# Patient Record
(demographics unavailable — no encounter records)

---

## 2025-03-06 NOTE — DISCUSSION/SUMMARY
[FreeTextEntry1] : - Pap/HPV obtained today - Bedside sono showed SLIUP, ALIS c/w LMP - Oriented to practice/goals of care - return for NIPS/prenatals - Referral given for Nuchal translucency - subchorionic hematoma and cervical polyp precautions reviewed   PHQ9 Screenin min

## 2025-03-06 NOTE — PHYSICAL EXAM
[Appropriately responsive] : appropriately responsive [Alert] : alert [No Acute Distress] : no acute distress [Soft] : soft [Non-tender] : non-tender [Non-distended] : non-distended [No HSM] : No HSM [No Lesions] : no lesions [No Mass] : no mass [Oriented x3] : oriented x3 [Examination Of The Breasts] : a normal appearance [No Masses] : no breast masses were palpable [Labia Majora] : normal [Labia Minora] : normal [Normal] : normal [Uterine Adnexae] : normal [Chaperone Present] : A chaperone was present in the examining room during all aspects of the physical examination [Polyp ___ cm] : [unfilled] ~[facility] polyp [FreeTextEntry2] : JOSE Nielson [FreeTextEntry5] : cervical polyp protruding from external os

## 2025-03-06 NOTE — HISTORY OF PRESENT ILLNESS
[No] : Patient does not have concerns regarding sex [Currently Active] : currently active [FreeTextEntry1] : 32yo   LMP 25 here for annual exam.  Also found to have missed menses and positive urine pregnancy test.

## 2025-03-06 NOTE — PHYSICAL EXAM
[Appropriately responsive] : appropriately responsive [Alert] : alert [No Acute Distress] : no acute distress [Soft] : soft [Non-tender] : non-tender [Non-distended] : non-distended [No HSM] : No HSM [No Lesions] : no lesions [No Mass] : no mass [Oriented x3] : oriented x3 [Examination Of The Breasts] : a normal appearance [No Masses] : no breast masses were palpable [Labia Majora] : normal [Labia Minora] : normal [Normal] : normal [Uterine Adnexae] : normal [Chaperone Present] : A chaperone was present in the examining room during all aspects of the physical examination [Polyp ___ cm] : [unfilled] ~[facility] polyp [FreeTextEntry2] : JOSE Nielson [FreeTextEntry5] : cervical polyp protruding from external os

## 2025-03-06 NOTE — HISTORY OF PRESENT ILLNESS
[No] : Patient does not have concerns regarding sex [Currently Active] : currently active [FreeTextEntry1] : 34yo   LMP 25 here for annual exam.  Also found to have missed menses and positive urine pregnancy test.

## 2025-05-15 NOTE — ASSESSMENT
[FreeTextEntry1] : Assessment: #Pregnancy in 2nd trimester  -so far no complications, follows with [facility] gyn  #Anemia #MARTA positive (JAIR Poly, and JAIR IGG, negative JAIR C3)  #TRINIDAD 1:2560, centromere pattern -will evaluate for hemolysis and test further rheumatological labs  -currently no clinical findings or positive ROS for scleroderma or lupus  -prior to this visit saw Dr. Josue Seth hematology, labs checked only    Discussion: SHAKEEL NARANJO is a 33 year old woman no PMH currently pregnant in 2nd trimester who presents with a positive TRINIDAD and is MRATA positive.   Based on available history, exam, and diagnostics patient presents with a positive TRINIDAD and MARTA positive. Hg has been around 10.5-10.8 which can also be seen in anemia of pregnancy but given the titer of the TRINIDAD and MARTA result will check further rheumatological labs, will want to rule out lupus.   Of note MARTA positivity can also be seen as a result of pregnancy if above is ruled out. She will continue to follow with OBGYN.    Plan: -follow up labs -continue to follow with OB/GYN    All questions and concerns addressed to my best possible ability, patient verbalizes understanding and is agreeable.   RTC in 3-4 weeks via video visit

## 2025-05-15 NOTE — HISTORY OF PRESENT ILLNESS
[FreeTextEntry1] : Rheumatology Consultation Note   Chief Complaint: positive TRINIDAD    History of Present Illness: SHAKEEL NARANJO is a 33 year old woman no PMH currently pregnant in 2nd trimester who presents with a positive TRINIDAD and MARTA positive.   TRINIDAD checked by hematology on 5/2/25, and was 1:2560 centromere pattern. Rest of labs were apparently negative.   Has had anemia since start of pregnancy, currently 2nd trimester. This is 1st pregnancy. No hx of miscarriages or blood clots.    Social Hx: no smoking hx, no etoh usage, no recreational drug use  Family Hx: no hx of AI disease  Inflammatory arthritis ROS negative for fevers, chills, symmetrical peripheral joint synovitis, prolonged AM stiffness, enthesitis, dactylitis, psoriasis/ rashes, eye inflammation, inflammatory low back pain, IBD. SLE ROS negative for oral ulcers, facial rash, other rash, chest pain, abdominal pain, hair loss, Raynaud's, joint swelling, unexplained numbness or weakness, seizures, frothy urine or hematuria. APLS ROS negative for uncomplicated pregnancies, no miscarriage, no thrombotic events.

## 2025-05-15 NOTE — PHYSICAL EXAM
[General Appearance - Alert] : alert [General Appearance - In No Acute Distress] : in no acute distress [Extraocular Movements] : extraocular movements were intact [Neck Appearance] : the appearance of the neck was normal [Musculoskeletal - Swelling] : no joint swelling seen [Motor Tone] : muscle strength and tone were normal [] : no rash [FreeTextEntry1] : no synovitis, mild lower ext edema

## 2025-05-15 NOTE — END OF VISIT
[Time Spent: ___ minutes] : I have spent [unfilled] minutes of time on the encounter which excludes teaching and separately reported services.
Sepsis Criteria were met:

## 2025-05-15 NOTE — ASSESSMENT
[FreeTextEntry1] : Assessment: #Pregnancy in 2nd trimester  -so far no complications, follows with [facility] gyn  #Anemia #MARTA positive (JARI Poly, and JAIR IGG, negative JAIR C3)  #TRINIDAD 1:2560, centromere pattern -will evaluate for hemolysis and test further rheumatological labs  -currently no clinical findings or positive ROS for scleroderma or lupus  -prior to this visit saw Dr. Josue Seth hematology, labs checked only    Discussion: SHAKEEL NARANJO is a 33 year old woman no PMH currently pregnant in 2nd trimester who presents with a positive TRINIDAD and is MARTA positive.   Based on available history, exam, and diagnostics patient presents with a positive TRINIDAD and MARTA positive. Hg has been around 10.5-10.8 which can also be seen in anemia of pregnancy but given the titer of the TRINIDAD and MARTA result will check further rheumatological labs, will want to rule out lupus.   Of note MARTA positivity can also be seen as a result of pregnancy if above is ruled out. She will continue to follow with OBGYN.    Plan: -follow up labs -continue to follow with OB/GYN    All questions and concerns addressed to my best possible ability, patient verbalizes understanding and is agreeable.   RTC in 3-4 weeks via video visit

## 2025-05-15 NOTE — DATA REVIEWED
[FreeTextEntry1] : Available notes, and pertinent labs & imaging in EMR reviewed. Paper records reviewed, scanned to EMR. Pertinent labs and imaging summarized in HPI or A/P.  [Ear Pain] : ear pain [Hearing Loss] : hearing loss [Negative] : Endocrine

## 2025-05-15 NOTE — ASSESSMENT
[FreeTextEntry1] : Assessment: #Pregnancy in 2nd trimester  -so far no complications, follows with [facility] gyn  #Anemia #MARTA positive (JAIR Poly, and JAIR IGG, negative JAIR C3)  #TRINIDAD 1:2560, centromere pattern -will evaluate for hemolysis and test further rheumatological labs  -currently no clinical findings or positive ROS for scleroderma or lupus  -prior to this visit saw Dr. Josue Seth hematology, labs checked only    Discussion: SHAKEEL NARANJO is a 33 year old woman no PMH currently pregnant in 2nd trimester who presents with a positive TRINIDAD and is MARTA positive.   Based on available history, exam, and diagnostics patient presents with a positive TRINIDAD and MARTA positive. Hg has been around 10.5-10.8 which can also be seen in anemia of pregnancy but given the titer of the TRINIDAD and MARTA result will check further rheumatological labs, will want to rule out lupus.   Of note MARTA positivity can also be seen as a result of pregnancy if above is ruled out. She will continue to follow with OBGYN.    Plan: -follow up labs -continue to follow with OB/GYN    All questions and concerns addressed to my best possible ability, patient verbalizes understanding and is agreeable.   RTC in 3-4 weeks via video visit

## 2025-05-29 NOTE — REASON FOR VISIT
[Home] : at home, [unfilled] , at the time of the visit. [Medical Office: ([facility])___] : at the medical office located in  [Telehealth (audio & video)] : This visit was provided via telehealth using real-time 2-way audio visual technology. [Verbal consent obtained from patient] : the patient, [unfilled] [Follow-Up: _____] : a [unfilled] follow-up visit [FreeTextEntry1] : anemia and positive TRINIDAD

## 2025-05-29 NOTE — HISTORY OF PRESENT ILLNESS
[FreeTextEntry1] : Rheumatology Consultation Note   Chief Complaint: positive TRINIDAD    History of Present Illness: SHAKEEL NARANJO is a 33 year old woman no PMH currently pregnant in 2nd trimester who presents with a positive TRINIDAD and MARTA positive.   TRINIDAD checked by hematology on 5/2/25, and was 1:2560 centromere pattern. Rest of labs were apparently negative.   Has had anemia since start of pregnancy, currently 2nd trimester. This is 1st pregnancy. No hx of miscarriages or blood clots.    Social Hx: no smoking hx, no etoh usage, no recreational drug use  Family Hx: no hx of AI disease  5/29/25: No new symptoms. Seeing Dr. Kessler later today, heme.   Inflammatory arthritis ROS negative for fevers, chills, symmetrical peripheral joint synovitis, prolonged AM stiffness, enthesitis, dactylitis, psoriasis/ rashes, eye inflammation, inflammatory low back pain, IBD. SLE ROS negative for oral ulcers, facial rash, other rash, chest pain, abdominal pain, hair loss, Raynaud's, joint swelling, unexplained numbness or weakness, seizures, frothy urine or hematuria. APLS ROS negative for uncomplicated pregnancies, no miscarriage, no thrombotic events.

## 2025-05-29 NOTE — ASSESSMENT
[FreeTextEntry1] : Assessment: #Pregnancy in 2nd trimester  -so far no complications, follows with [facility] gyn Dr. Carvalho  #Anemia #MARTA positive (JAIR Poly, and JAIR IGG, negative JAIR C3)  #TRINIDAD 1:2560, centromere pattern, repeat labs noted for positive centromere antibody and weak positive RNP  -negative lupus AC, beta 2 glycoprotein, cardiolipin antibodies, dsDNA, UA, UPCR, APTT, SPEP, LDH, haptoglobin, hep B and C serologies, smith, SSA, SSB, C3, C4, RNA polymerase III, scleroderma antibodies  -currently no clinical findings or positive ROS for scleroderma, lupus, mixed connective tissue disease  -prior to this visit saw Dr. Josue Seth hematology, labs checked only    Discussion: SHAKEEL NARANJO is a 33 year old woman no PMH currently pregnant in 2nd trimester who presents with a positive TRINIDAD and is MARTA positive.   Based on available history, exam, and diagnostics patient presents with a positive TRINIDAD and MARTA positive. Her labs for lupus were negative in addition to APLS labs. She does have a positive centromere antibody but no sclerodactyly and scleroderma does not cause a positive MARTA test. She has a very mild positive RNP but no features of raynauds and again, mixed connective tissue disease does not cause a positive MARTA test either.   Pregnancy itself can cause positive MARTA test or even false positive results, I will message Dr. Carvalho her GYN doctor to see why it was checked and if any other GYN etiologies could explain it. I have also asked pt to see heme again which she is already doing now given that her lupus labs in particular are negative and if there is any other etiologies for the MARTA result.  Her Hg is stable and hemolysis labs were negative. There is no indication for immunosuppressive therapy, I will plan on repeating serologies again after her pregnancy is done.   Plan: -continue to follow with OB/GYN  -to see heme again this afternoon   All questions and concerns addressed to my best possible ability, patient verbalizes understanding and is agreeable.   RTC in November, at that time to repeat serologies and consider TTE given positive centromere antibody